# Patient Record
Sex: FEMALE | Race: WHITE | NOT HISPANIC OR LATINO | URBAN - METROPOLITAN AREA
[De-identification: names, ages, dates, MRNs, and addresses within clinical notes are randomized per-mention and may not be internally consistent; named-entity substitution may affect disease eponyms.]

---

## 2018-10-04 ENCOUNTER — EMERGENCY (EMERGENCY)
Facility: HOSPITAL | Age: 18
LOS: 1 days | Discharge: HOME | End: 2018-10-04
Attending: EMERGENCY MEDICINE
Payer: MEDICAID

## 2018-10-04 VITALS
SYSTOLIC BLOOD PRESSURE: 111 MMHG | OXYGEN SATURATION: 100 % | RESPIRATION RATE: 16 BRPM | DIASTOLIC BLOOD PRESSURE: 58 MMHG | TEMPERATURE: 98 F | HEART RATE: 67 BPM

## 2018-10-04 VITALS — HEIGHT: 57 IN | WEIGHT: 115.08 LBS

## 2018-10-04 LAB
ALBUMIN SERPL ELPH-MCNC: 4.1 G/DL — SIGNIFICANT CHANGE UP (ref 3.5–5)
ALP SERPL-CCNC: 73 U/L — SIGNIFICANT CHANGE UP (ref 40–120)
ALT FLD-CCNC: 42 U/L DA — SIGNIFICANT CHANGE UP (ref 10–60)
ANION GAP SERPL CALC-SCNC: 6 MMOL/L — SIGNIFICANT CHANGE UP (ref 5–17)
APPEARANCE UR: CLEAR — SIGNIFICANT CHANGE UP
AST SERPL-CCNC: 26 U/L — SIGNIFICANT CHANGE UP (ref 10–40)
BILIRUB SERPL-MCNC: 0.3 MG/DL — SIGNIFICANT CHANGE UP (ref 0.2–1.2)
BILIRUB UR-MCNC: NEGATIVE — SIGNIFICANT CHANGE UP
BUN SERPL-MCNC: 15 MG/DL — SIGNIFICANT CHANGE UP (ref 7–18)
CALCIUM SERPL-MCNC: 9.6 MG/DL — SIGNIFICANT CHANGE UP (ref 8.4–10.5)
CHLORIDE SERPL-SCNC: 106 MMOL/L — SIGNIFICANT CHANGE UP (ref 96–108)
CO2 SERPL-SCNC: 26 MMOL/L — SIGNIFICANT CHANGE UP (ref 22–31)
COLOR SPEC: YELLOW — SIGNIFICANT CHANGE UP
CREAT SERPL-MCNC: 0.73 MG/DL — SIGNIFICANT CHANGE UP (ref 0.5–1.3)
DIFF PNL FLD: NEGATIVE — SIGNIFICANT CHANGE UP
GLUCOSE SERPL-MCNC: 81 MG/DL — SIGNIFICANT CHANGE UP (ref 70–99)
GLUCOSE UR QL: NEGATIVE — SIGNIFICANT CHANGE UP
HCT VFR BLD CALC: 41.1 % — SIGNIFICANT CHANGE UP (ref 34.5–45)
HGB BLD-MCNC: 13 G/DL — SIGNIFICANT CHANGE UP (ref 11.5–15.5)
KETONES UR-MCNC: NEGATIVE — SIGNIFICANT CHANGE UP
LEUKOCYTE ESTERASE UR-ACNC: NEGATIVE — SIGNIFICANT CHANGE UP
LIDOCAIN IGE QN: 126 U/L — SIGNIFICANT CHANGE UP (ref 73–393)
MCHC RBC-ENTMCNC: 27.7 PG — SIGNIFICANT CHANGE UP (ref 27–34)
MCHC RBC-ENTMCNC: 31.6 GM/DL — LOW (ref 32–36)
MCV RBC AUTO: 87.8 FL — SIGNIFICANT CHANGE UP (ref 80–100)
NITRITE UR-MCNC: NEGATIVE — SIGNIFICANT CHANGE UP
PH UR: 5 — SIGNIFICANT CHANGE UP (ref 5–8)
PLATELET # BLD AUTO: 211 K/UL — SIGNIFICANT CHANGE UP (ref 150–400)
POTASSIUM SERPL-MCNC: 4.9 MMOL/L — SIGNIFICANT CHANGE UP (ref 3.5–5.3)
POTASSIUM SERPL-SCNC: 4.9 MMOL/L — SIGNIFICANT CHANGE UP (ref 3.5–5.3)
PROT SERPL-MCNC: 7.8 G/DL — SIGNIFICANT CHANGE UP (ref 6–8.3)
PROT UR-MCNC: NEGATIVE — SIGNIFICANT CHANGE UP
RBC # BLD: 4.69 M/UL — SIGNIFICANT CHANGE UP (ref 3.8–5.2)
RBC # FLD: 11.7 % — SIGNIFICANT CHANGE UP (ref 10.3–14.5)
SODIUM SERPL-SCNC: 138 MMOL/L — SIGNIFICANT CHANGE UP (ref 135–145)
SP GR SPEC: 1.01 — SIGNIFICANT CHANGE UP (ref 1.01–1.02)
UROBILINOGEN FLD QL: NEGATIVE — SIGNIFICANT CHANGE UP
WBC # BLD: 7.1 K/UL — SIGNIFICANT CHANGE UP (ref 3.8–10.5)
WBC # FLD AUTO: 7.1 K/UL — SIGNIFICANT CHANGE UP (ref 3.8–10.5)

## 2018-10-04 PROCEDURE — 83690 ASSAY OF LIPASE: CPT

## 2018-10-04 PROCEDURE — 76705 ECHO EXAM OF ABDOMEN: CPT | Mod: 26

## 2018-10-04 PROCEDURE — 99284 EMERGENCY DEPT VISIT MOD MDM: CPT | Mod: 25

## 2018-10-04 PROCEDURE — 81003 URINALYSIS AUTO W/O SCOPE: CPT

## 2018-10-04 PROCEDURE — 76705 ECHO EXAM OF ABDOMEN: CPT

## 2018-10-04 PROCEDURE — 96374 THER/PROPH/DIAG INJ IV PUSH: CPT

## 2018-10-04 PROCEDURE — 80053 COMPREHEN METABOLIC PANEL: CPT

## 2018-10-04 PROCEDURE — 71046 X-RAY EXAM CHEST 2 VIEWS: CPT | Mod: 26

## 2018-10-04 PROCEDURE — 99284 EMERGENCY DEPT VISIT MOD MDM: CPT

## 2018-10-04 PROCEDURE — 85027 COMPLETE CBC AUTOMATED: CPT

## 2018-10-04 PROCEDURE — 71046 X-RAY EXAM CHEST 2 VIEWS: CPT

## 2018-10-04 RX ORDER — KETOROLAC TROMETHAMINE 30 MG/ML
30 SYRINGE (ML) INJECTION ONCE
Qty: 0 | Refills: 0 | Status: DISCONTINUED | OUTPATIENT
Start: 2018-10-04 | End: 2018-10-04

## 2018-10-04 RX ORDER — SODIUM CHLORIDE 9 MG/ML
1000 INJECTION INTRAMUSCULAR; INTRAVENOUS; SUBCUTANEOUS ONCE
Qty: 0 | Refills: 0 | Status: COMPLETED | OUTPATIENT
Start: 2018-10-04 | End: 2018-10-04

## 2018-10-04 RX ORDER — DIPHENHYDRAMINE HCL 50 MG
50 CAPSULE ORAL ONCE
Qty: 0 | Refills: 0 | Status: COMPLETED | OUTPATIENT
Start: 2018-10-04 | End: 2018-10-04

## 2018-10-04 RX ADMIN — Medication 30 MILLIGRAM(S): at 13:53

## 2018-10-04 RX ADMIN — SODIUM CHLORIDE 1000 MILLILITER(S): 9 INJECTION INTRAMUSCULAR; INTRAVENOUS; SUBCUTANEOUS at 13:53

## 2018-10-04 RX ADMIN — Medication 50 MILLIGRAM(S): at 15:36

## 2018-10-04 NOTE — ED PROVIDER NOTE - PROGRESS NOTE DETAILS
NP NOTE: Pt seen by intake physician and HPI/ROS/PE/MDM reviewed. Pt seen and evaluated. Discussed plan and any resulted studies at this time. Will continue to monitor and re-evaluate.  Re-Evaluation: discussed results with pt. she now has several hives noted to right upper extremity and right side of faces accompanied with pruritis. no angioedema, wheezing, shortness of breath, nausea, vomiting. she does report getting hives when she gets hot but this seems different.  has taken motrin at home in past without complication. questionable reaction to toradol. will treat with benadryl at this time. hive improved. will shannan

## 2018-10-04 NOTE — ED ADULT NURSE REASSESSMENT NOTE - NS ED NURSE REASSESS COMMENT FT1
pt is alert awake oriented x3 diffused hives noted on Rt arm, Rt shoulder and face no acute respiratory distress noted benadryl 50mg po given as per MAR pending reassessment

## 2018-10-04 NOTE — ED PROVIDER NOTE - OBJECTIVE STATEMENT
19 y/o F pt with no significant PMHx and no significant PSHx sent to the ED from urgent care for R flank pain radiating to her RUQ x1 month, worsening over the past 3 days. Pt denies fever, chills, nausea, vomiting, diarrhea, menstruation problems, urinary symptoms or any other complaints. NKDA.

## 2018-10-04 NOTE — ED ADULT NURSE NOTE - OBJECTIVE STATEMENT
pt from home c/o of Rt flank pain radiating to Rt lower abdomen x3 days pt reports "i've had this pain on and off since August last 3 days is constant sharp pain" denies any nausea/vomiting abdomin soft nontender

## 2018-10-04 NOTE — ED PROVIDER NOTE - MEDICAL DECISION MAKING DETAILS
19 y/o F pt presents with RUQ and R flank pain. Will order US of gall bladder, give IV fluids, basic labs, pain control and reassess.

## 2018-10-04 NOTE — ED STATDOCS - OBJECTIVE STATEMENT
Telemedicine assessment was conducted (using real time 2 way audio-video technology) by Aric Eckert MD located at [address] [place] [id number]  ++++++++++++++++++++++++  Pertinent patient history and initial plan: 18 y F 2 month RUQ/R flank pain.  Daily.  Worse c movement, deep inspiration.  Also c pain at rest but not as bad.  Pain not post prandial.  No relieving factors.  Radiates to flank.  No dysuria.  No f/c.  Presents today b/c pain in last 3 days has significantly worsened  PMH -- none.  LMP 9/15  MDM -- nonspecicifc RUQ pain, worse c palpation.  XR, RUQ sono, labs, reassess.  -BMM

## 2018-10-04 NOTE — ED ADULT NURSE NOTE - NSIMPLEMENTINTERV_GEN_ALL_ED
Implemented All Universal Safety Interventions:  [place] to call system. Call bell, personal items and telephone within reach. Instruct patient to call for assistance. Room bathroom lighting operational. Non-slip footwear when patient is off stretcher. Physically safe environment: no spills, clutter or unnecessary equipment. Stretcher in lowest position, wheels locked, appropriate side rails in place.

## 2019-03-11 NOTE — ED PROVIDER NOTE - DIAGNOSIS COUNSELING, MDM
Ortho Post Op Check    Procedure: right TSA  Surgeon: MD Leonel    Pt comfortable without complaints, pain controlled  Denies CP, SOB, N/V, numbness/tingling of arm,  Has numbness along right peroneal nerve distribution  Pain Rx:  acetaminophen  IVPB .. 1000 milliGRAM(s) IV Intermittent every 6 hours  celecoxib 200 milliGRAM(s) Oral every 12 hours  HYDROmorphone  Injectable 0.5 milliGRAM(s) IV Push every 3 hours PRN  ondansetron Injectable 4 milliGRAM(s) IV Push every 6 hours PRN  oxyCODONE    IR 5 milliGRAM(s) Oral every 3 hours PRN  oxyCODONE    IR 10 milliGRAM(s) Oral every 3 hours PRN      General Exam:  Vital Signs Last 24 Hrs  T(C): --  T(F): --  HR: 88 (03-11-19 @ 15:25) (82 - 94)  BP: 115/76 (03-11-19 @ 15:25) (102/57 - 130/77)  BP(mean): --  RR: 14 (03-11-19 @ 15:25) (14 - 21)  SpO2: 96% (03-11-19 @ 15:25) (87% - 100%)    General: Pt Alert and oriented, NAD, controlled pain.  Heart: RRR no murmur  Lungs: clear  Abdomen: BS+, soft  right shoulder dressing dry/intact.  N-V intact to fingers  Neuro/Vasc: Feet toes warm, pink. DP = 2+. No calf tenderness bilat.. Has decr sensation right peroneal nerve distribution . Full AROM bilat feet & toes. EHL = 5/5  VTEP: On Venodynes Bilat + BID Ecotrin       A/P: 52yMale POD#0 s/p   - Stable  - Pain Control  - DVT ppx: ecotrin  - Post op abx: ancef-- 2 doses remain  - PT eval   - Weight bearing status: NWB right shoulder conducted a detailed discussion... I had a detailed discussion with the patient and/or guardian regarding the historical points, exam findings, and any diagnostic results supporting the discharge/admit diagnosis.

## 2019-12-09 NOTE — ED ADULT NURSE NOTE - NSFALLRSKASSESSTYPE_ED_ALL_ED
"17 y.o. female  at 38w4d   Reports + FM, denies VB, LOF or regular CTX, some lower abd "pains"  Doing well without concerns, ready to have baby this week. Pt questioning ROCAEL, exp record states based on a 6w2d US  cx soft/thin/posterior difficult to reach int os, pt very tense during exam  Discussed staying active, s/s of labor, pt aware of LND location and phone number if needed  Reviewed warning signs, normal FKCs, labor precautions and how/when to call.  RTC x 1 wk, call or present sooner prn. al      "
Initial (On Arrival)

## 2020-07-13 NOTE — ED PROVIDER NOTE - NS ED NOTE AC HIGH RISK COUNTRIES
See hospital course for functional, cognitive/speech/language, and nutrition/swallow status.    Recommendations  -  Encourage mobility, OOB in chair, and early ambulation as appropriate   -  PT, OT, SLP evaluate and treat  -  Monitor mood and sleep disturbances and establish consistent sleep-wake cycle  -  Monitor for bowel and bladder dysfunction  -  Monitor for shoulder pain/subluxation and spasticity  -  Monitor for and prevent skin breakdown and pressure ulcers  · Early mobility, repositioning/weight shifting every 20-30 minutes when sitting, turn patient every 2 hours, proper mattress/overlay and chair cushioning, pressure relief/heel protector boots     No

## 2021-07-20 NOTE — ED ADULT NURSE NOTE - PRO INTERPRETER NEED 2
----- Message from Zunilda Medina MD sent at 7/20/2021  8:29 AM CDT -----  LAbs are good.  She is in my schedule for Friday? I saw her at [facility] last week Thursday.  Does not need this appt.  
Pt.'   notified of results and verbalized there understanding.   
English

## 2021-11-28 NOTE — ED ADULT NURSE NOTE - CAS EDN DISCHARGE INTERVENTIONS
RN placed a gel ankle stabilizer to help with pain control   not applicable IV discontinued, cath removed intact

## 2022-07-22 NOTE — ED PROVIDER NOTE - DISCHARGE DATE
TRAUMA HISTORY AND PHYSICAL    CHIEF COMPLAINT: Cervical spine injury paresthesias transfer for neurosurgical evaluation.     HISTORY OF PRESENT ILLNESS:  Savanah Alvarenga is a very pleasant 19-year-old female.  Trauma green transfer to our [facility].  She was evaluated outside hospital after fall from horse.  Imaging other hospital report imaging C6 fracture.  Per report no injuries on CT of the head, no injuries on CTA of neck.  Official reports from outside hospital pending     Savanah Alvarenga is awake alert and appropriate.  She reports fall from horse landing on her head.  She states wearing helmet no loss of consciousness.  She reports pain in the neck.  She states pain was severe responding to medication.  She reports paresthesias left side arm worse than leg she reports weakness left arm worse than leg.  She states no headache no change in vision no nausea  No facial pain.  States no chest pain nor difficulty breathing  States no abdominal pain  States no pain in the extremities    Emergency department has consulted neurosurgery     the patient was triaged as a Trauma Green Transfer in accordance with established pre hospital protocols. An expeditious primary and secondary survey with required adjuncts was conducted. See Trauma Narrator for full details.    PAST MEDICAL HISTORY:  has a past medical history of Migraines and RSV (acute bronchiolitis due to respiratory syncytial virus).    She has no past medical history of ASTHMA, CAD (coronary artery disease), Cancer (HCC), Congestive heart failure (HCC), COPD, Diabetes, Hypertension, Liver disease, Psychiatric disorder, Renal disorder, Seizure disorder (HCC), or Stroke (HCC).     PAST SURGICAL HISTORY:  has a past surgical history that includes other.    ALLERGIES:   Allergies   Allergen Reactions   • Zofran [Ondansetron]       CURRENT MEDICATIONS:    Home Medications    **Home medications have not yet been reviewed for this encounter**       FAMILY  "HISTORY: family history is not on file.    SOCIAL HISTORY:  reports that she has never smoked. She has never used smokeless tobacco. She reports current drug use. Drug: Inhaled. She reports that she does not drink alcohol.    REVIEW OF SYSTEMS:    Reports neck pain  Reports paresthesias weakness left arm and leg  States no loss of consciousness no headache no change in vision  States no chest pain or difficulty breathing  States no abdominal pain  States no pain in the extremities    PHYSICAL EXAMINATION:     CONSTITUTIONAL:     Vital Signs: /78   Pulse 69   Temp 36.7 °C (98 °F)   Resp 16   Ht 1.803 m (5' 11\")   Wt 81.6 kg (180 lb)   SpO2 99%    General Appearance: Awake alert  HEENT:    No facial tenderness   No bleeding ears nose or mouth  NECK:    Known fracture c-collar in place spine precautions   Trachea midline no stridor  RESPIRATORY:   Breathing with ease no distress   Breath sounds bilaterally   No tenderness no deformity report from transferring hospital pulmonary contusion  CARDIOVASCULAR:   Regular rate   Skin warm brisk capillary refill   Palpable radial pulse.   ABDOMEN:   Soft nontender nondistended  MUSCULOSKELETAL:   No tenderness no deformity   Reports numbness and tingling left arm and left leg able to move against gravity decreased strength in left fingers    SKIN:    Appropriate color and temperature  NEUROLOGIC:    GCS 15.  Known cervical fracture paresthesias in the left weakness left arm  PSYCHIATRIC:   Appropriate mood and behavior    LABORATORY VALUES:       Recent Labs     07/21/22  1747   SODIUM 141   POTASSIUM 2.9*   CHLORIDE 109   CO2 13*   GLUCOSE 100*   BUN 13   CREATININE 0.96   CALCIUM 8.9     Recent Labs     07/21/22  1747   ASTSGOT 22   ALTSGPT 7   TBILIRUBIN 2.1*   ALKPHOSPHAT 67   GLOBULIN 2.5   INR 1.14*     Recent Labs     07/21/22  1747   APTT 26.8   INR 1.14*        IMAGING:   OUTSIDE IMAGES-DX CHEST   Final Result      OUTSIDE IMAGES-DX PELVIS   Final Result    "   OUTSIDE IMAGES-CT CHEST/ABDOMEN/PELVIS   Final Result      OUTSIDE IMAGES-CT THORACIC SPINE   Final Result      OUTSIDE IMAGES-CT LUMBAR SPINE   Final Result      OUTSIDE IMAGES-CT CERVICAL SPINE   Final Result      CT-FOREIGN FILM CAT SCAN   Final Result      OUTSIDE IMAGES-CT HEAD   Final Result      MR-CERVICAL SPINE-W/O    (Results Pending)       IMPRESSION AND PLAN:     Active Hospital Problems    Diagnosis    • Closed displaced fracture of sixth cervical vertebra (HCC) [S12.500A]      Priority: High   • Contraindication to deep vein thrombosis (DVT) prophylaxis [Z53.09]      Priority: Medium   • Trauma [T14.90XA]      Priority: Low   • History of Chiari malformation [Z86.69]      Priority: Low       DISPOSITION:    Follow-up outside official reads  Admit to ICU  Continue C-spine precautions  Follow-up imaging  Follow-up neurosurgical evaluation recommendations  Pain control  Provide encouragement and support.    Monitor neurostatus and comfort level  Adjust medications and comfort measures as needed    Card   monitor for signs of bleeding  Continue to monitor to maintain adequate HR and BP  Follow up labs    Pulm  continue aggressive pulmonary hygiene  Encourage cough deep breath, IS  scd dvt prohylaxis    GI  Nutritional support when cleared by neurosurgery  Continue Bowel regime  Monitor abdominal exan    Renal  IV hydration  Monitor urine output, fluid balance  Follow-up labs replace electrolytes as needed    Plan tertiary survey    Discussed with neurosurgery  Discussed with ED provider  Discussed findings and plan with patient    CRITICAL CARE TIME: 45 minutes excluding procedures.  ____________________________________   Mauricio Gaffney M.D.    DD: 7/21/2022  6:07 PM   04-Oct-2018

## 2023-07-19 ENCOUNTER — EMERGENCY (EMERGENCY)
Facility: HOSPITAL | Age: 23
LOS: 1 days | Discharge: HOME | End: 2023-07-19
Admitting: EMERGENCY MEDICINE
Payer: COMMERCIAL

## 2023-07-19 VITALS
HEART RATE: 83 BPM | SYSTOLIC BLOOD PRESSURE: 139 MMHG | TEMPERATURE: 99 F | OXYGEN SATURATION: 99 % | DIASTOLIC BLOOD PRESSURE: 79 MMHG | RESPIRATION RATE: 18 BRPM

## 2023-07-19 PROCEDURE — 99284 EMERGENCY DEPT VISIT MOD MDM: CPT

## 2023-07-20 PROCEDURE — 73630 X-RAY EXAM OF FOOT: CPT | Mod: 26,LT

## 2023-07-20 PROCEDURE — 73610 X-RAY EXAM OF ANKLE: CPT | Mod: 26,LT

## 2023-07-20 RX ORDER — ACETAMINOPHEN 500 MG
975 TABLET ORAL ONCE
Refills: 0 | Status: COMPLETED | OUTPATIENT
Start: 2023-07-20 | End: 2023-07-20

## 2023-07-20 RX ADMIN — Medication 975 MILLIGRAM(S): at 01:11

## 2023-07-20 NOTE — ED PROVIDER NOTE - PHYSICAL EXAMINATION
+ Tender to the dorsal aspect of the L foot. No crepitus, cap refill is <2 seconds, intact sensation to light touch.

## 2023-07-20 NOTE — ED ADULT NURSE NOTE - OBJECTIVE STATEMENT
Patient received in intake. A&O4. Ambulatory at baseline. Denies Past medical history. Came into ED with complaints of left foot pain after phone dropped onto foot. Patient states pain is worse when ambulating. Respirations even and unlabored. Denies SOB, chest pain, N/V/D, fevers. Comfort and safety maintained. Friend at bedside.

## 2023-07-20 NOTE — ED ADULT NURSE NOTE - NSFALLUNIVINTERV_ED_ALL_ED
Bed/Stretcher in lowest position, wheels locked, appropriate side rails in place/Call bell, personal items and telephone in reach/Instruct patient to call for assistance before getting out of bed/chair/stretcher/Non-slip footwear applied when patient is off stretcher/[place] to call system/Physically safe environment - no spills, clutter or unnecessary equipment/Purposeful proactive rounding/Room/bathroom lighting operational, light cord in reach

## 2023-07-20 NOTE — ED PROVIDER NOTE - PATIENT PORTAL LINK FT
You can access the FollowMyHealth Patient Portal offered by [facility] by registering at the following website: [email or web address]. By joining [facility]’s FollowMyHealth portal, you will also be able to view your health information using other applications (apps) compatible with our system.

## 2023-07-20 NOTE — ED PROVIDER NOTE - OBJECTIVE STATEMENT
23 Y/O F denies PMH or PSH states shortly before ED arrival she dropped a phone on her L foot. Pt states that she has had difficulty walking due to pain since the injury. Pt states she did not take medication for pain prior to ED arrival. Pt denies numbness or weakness. Denies any other sx or acute complaints.

## 2023-07-20 NOTE — ED PROVIDER NOTE - CLINICAL SUMMARY MEDICAL DECISION MAKING FREE TEXT BOX
23 Y/O F denies PMH or PSH states shortly before ED arrival she dropped a phone on her L foot. Pt states that she has had difficulty walking due to pain since the injury. Pt states she did not take medication for pain prior to ED arrival. Pt denies numbness or weakness. Plan is X-rays to eval for osseous abnormality.